# Patient Record
Sex: MALE | Race: BLACK OR AFRICAN AMERICAN | Employment: FULL TIME | URBAN - METROPOLITAN AREA
[De-identification: names, ages, dates, MRNs, and addresses within clinical notes are randomized per-mention and may not be internally consistent; named-entity substitution may affect disease eponyms.]

---

## 2023-02-14 ENCOUNTER — OFFICE VISIT (OUTPATIENT)
Dept: ORTHOPEDIC SURGERY | Age: 53
End: 2023-02-14
Payer: COMMERCIAL

## 2023-02-14 VITALS — HEIGHT: 68 IN | BODY MASS INDEX: 27.28 KG/M2 | WEIGHT: 180 LBS

## 2023-02-14 DIAGNOSIS — S62.662A NONDISPLACED FRACTURE OF DISTAL PHALANX OF RIGHT MIDDLE FINGER, INITIAL ENCOUNTER FOR CLOSED FRACTURE: Primary | ICD-10-CM

## 2023-02-14 DIAGNOSIS — S61.214A LACERATION OF RIGHT RING FINGER W/O FOREIGN BODY W/O DAMAGE TO NAIL, INITIAL ENCOUNTER: ICD-10-CM

## 2023-02-14 DIAGNOSIS — S61.302A AVULSION OF NAIL OF RIGHT MIDDLE FINGER: ICD-10-CM

## 2023-02-14 RX ORDER — HYDROCODONE BITARTRATE AND ACETAMINOPHEN 5; 325 MG/1; MG/1
1 TABLET ORAL
Qty: 12 TABLET | Refills: 0 | Status: SHIPPED | OUTPATIENT
Start: 2023-02-14 | End: 2023-02-19

## 2023-02-14 RX ORDER — BUPIVACAINE HYDROCHLORIDE 5 MG/ML
8 INJECTION, SOLUTION EPIDURAL; INTRACAUDAL ONCE
Status: COMPLETED | OUTPATIENT
Start: 2023-02-14 | End: 2023-02-14

## 2023-02-14 RX ORDER — CEPHALEXIN 500 MG/1
CAPSULE ORAL
COMMUNITY
Start: 2023-02-10

## 2023-02-14 RX ADMIN — BUPIVACAINE HYDROCHLORIDE 40 MG: 5 INJECTION, SOLUTION EPIDURAL; INTRACAUDAL at 11:05

## 2023-02-14 NOTE — PROGRESS NOTES
Angus Connor (: 1970) is a 50 y.o. male patient here for evaluation of the following chief complaint(s):  Hand Pain (Right index and middle fingers crushed in car 2/10/23)       ASSESSMENT/PLAN:  Below is the assessment and plan developed based on review of pertinent history, physical exam, labs, studies, and medications. 1. Nondisplaced fracture of distal phalanx of right middle finger, initial encounter for closed fracture  -     HYDROcodone-acetaminophen (Norco) 5-325 mg per tablet; Take 1 Tablet by mouth every six (6) hours as needed for Pain for up to 5 days. Max Daily Amount: 4 Tablets., Normal, Disp-12 Tablet, R-0  -     MT CLTX DSTL PHLNGL FX FNGR/THMB W/O MANJ EA  2. Avulsion of nail of right middle finger  -     MT AVULSION NAIL PLATE PARTIAL/COMPLETE SIMPLE 1  -     bupivacaine (PF) (MARCAINE) 0.5 % (5 mg/mL) injection 40 mg; 40 mg (8 mL), Peripheral Nerve Block, ONCE, 1 dose, On 23 at 1200  -     HYDROcodone-acetaminophen (Norco) 5-325 mg per tablet; Take 1 Tablet by mouth every six (6) hours as needed for Pain for up to 5 days. Max Daily Amount: 4 Tablets., Normal, Disp-12 Tablet, R-0  3. Laceration of right ring finger w/o foreign body w/o damage to nail, initial encounter  -     HYDROcodone-acetaminophen (Norco) 5-325 mg per tablet; Take 1 Tablet by mouth every six (6) hours as needed for Pain for up to 5 days. Max Daily Amount: 4 Tablets., Normal, Disp-12 Tablet, R-0    Had a thorough discussion with the patient regarding the above problems. Due to his avulsion of his right middle finger nail we discussed procedure for nail plate removal.  This also allowed us to visualize the nailbed which did not show laceration. Informed consent was obtained for the procedure. This was scanned into the chart. We discussed risks, benefits and alternatives to surgery. After thorough discussion ultimately the patient elected to proceed with operative intervention.   We discussed the risks including but not limited to postoperative pain, swelling, bruising, bleeding, scarring, infection, damage to neurovascular structures. Risk of permanent or temporary loss of range of motion, need for additional surgery, rejection of foreign material such as metal, suture or other tissue. We discussed daily dressing changes to the right middle finger beginning on Thursday. He will follow-up in 1 month for repeat x-rays 3 views of the right middle finger. In regards to the ring finger recommended daily dressing changes. He is in fracture care for the middle finger, will be in [facility] for the nail plate removal.    Patient verbalized understanding and elected to proceed. All questions were answered to the patient's apparent satisfaction. SUBJECTIVE/OBJECTIVE:  HPI    46-year-old male injured his right hand when working on a car. He got the middle and ring fingers crushed. He was seen in the emergency department where x-rays were obtained and I reviewed those today. Showing nondisplaced distal phalanx fracture of the middle finger. He was then referred here. Patient reports a sudden onset of symptoms. Duration of problem 3 days. Symptom Severity 8/10  Symptom Frequency intermittent        No Known Allergies    Current Outpatient Medications   Medication Sig    HYDROcodone-acetaminophen (Norco) 5-325 mg per tablet Take 1 Tablet by mouth every six (6) hours as needed for Pain for up to 5 days. Max Daily Amount: 4 Tablets. cephALEXin (KEFLEX) 500 mg capsule      No current facility-administered medications for this visit.        Social History     Socioeconomic History    Marital status: SINGLE     Spouse name: Not on file    Number of children: Not on file    Years of education: Not on file    Highest education level: Not on file   Occupational History    Not on file   Tobacco Use    Smoking status: Never    Smokeless tobacco: Never   Vaping Use    Vaping Use: Unknown   Substance and Sexual Activity    Alcohol use: Not on file    Drug use: Never    Sexual activity: Not on file   Other Topics Concern    Not on file   Social History Narrative    Not on file     Social Determinants of Health     Financial Resource Strain: Not on file   Food Insecurity: Not on file   Transportation Needs: Not on file   Physical Activity: Not on file   Stress: Not on file   Social Connections: Not on file   Intimate Partner Violence: Not on file   Housing Stability: Not on file       History reviewed. No pertinent surgical history. History reviewed. No pertinent family history. Review of Systems    No flowsheet data found. Vitals:  Ht 5' 8\" (1.727 m)   Wt 180 lb (81.6 kg)   BMI 27.37 kg/m²    Estimated body surface area is 1.98 meters squared as calculated from the following:    Height as of this encounter: 5' 8\" (1.727 m). Weight as of this encounter: 180 lb (81.6 kg). Body mass index is 27.37 kg/m². Physical Exam    Musculoskeletal Exam:    Right Upper Extremity EXAMINATION    Patient has tenderness to palpation at the distal phalanges of the middle and ring fingers. The nail plate of the middle finger is avulsed and compressing the skin on the eponychial fold. Intact sensation distally on the digits. Ring finger has small laceration on the eponychial him but well approximated. Patient fires AIN, PIN and ulnar nerves. Sensation is grossly intact in the median, radial and ulnar distribution. Hand is pink and appears well-perfused. Hand is warm. Skin is intact. Compartments are soft and compressible.       Consitutional: Healthy  Skin:   - Edema - mild  - Cellulitis - No    Neuro: Numbness or tingling in R/L arm: No    Psych: Affect normal    Cardiovascular: Capillary Refill < 2 seconds in upper extremities    Respiratory: Non-Labored Breathing    ROS:    Constitutional: Denies fever/chills    Respiratory: Denies SOB        Imaging:    [facility]  showing nondisplaced fracture of the middle finger, nothing of the ring finger. XR Results (most recent):  No results found for this or any previous visit. Orders Placed This Encounter    [place] AVULSION NAIL PLATE PARTIAL/COMPLETE SIMPLE 1    [place] CLTX DSTL PHLNGL FX FNGR/THMB W/O MANJ EA    bupivacaine (PF) (MARCAINE) 0.5 % (5 mg/mL) injection 40 mg    HYDROcodone-acetaminophen (Norco) 5-325 mg per tablet     Sig: Take 1 Tablet by mouth every six (6) hours as needed for Pain for up to 5 days. Max Daily Amount: 4 Tablets. Dispense:  12 Tablet     Refill:  0          Procedures:    Informed consent was obtained. The digit was cleaned with alcohol and digital nerve block was performed using 8 cc of half percent Marcaine. After allowing this to set up I prepped the digit with Betadine and draped it using standard sterile fashion. Timeout was performed confirming correct patient procedure site and laterality, he was awake for the procedure. I began by using a hemostat underneath the nail plate distally and created a plane between the nailbed and the nail plate. After doing so I was able to use a needle  and carefully remove the plate in its entirety. It was macerating the skin on the eponychial fold. At this point I was able to examine the nailbed itself and did not note any lacerations. I cleaned the digit and placed Xeroform within the eponychial fold. I then placed an additional piece of Xeroform on top of that. This was wrapped with gauze and a finger splint was applied. He remained neurovascularly intact. An electronic signature was used to authenticate this note.   -- Afshan Valenzuela MD

## 2023-05-23 RX ORDER — CEPHALEXIN 500 MG/1
CAPSULE ORAL
COMMUNITY
Start: 2023-02-10